# Patient Record
Sex: MALE | Race: WHITE
[De-identification: names, ages, dates, MRNs, and addresses within clinical notes are randomized per-mention and may not be internally consistent; named-entity substitution may affect disease eponyms.]

---

## 2018-12-12 ENCOUNTER — HOSPITAL ENCOUNTER (OUTPATIENT)
Dept: HOSPITAL 75 - CARD | Age: 58
End: 2018-12-12
Attending: NURSE PRACTITIONER
Payer: COMMERCIAL

## 2018-12-12 DIAGNOSIS — R42: ICD-10-CM

## 2018-12-12 DIAGNOSIS — I49.3: Primary | ICD-10-CM

## 2018-12-12 DIAGNOSIS — R51: ICD-10-CM

## 2018-12-12 PROCEDURE — 93226 XTRNL ECG REC<48 HR SCAN A/R: CPT

## 2018-12-12 PROCEDURE — 93225 XTRNL ECG REC<48 HRS REC: CPT

## 2020-12-14 ENCOUNTER — HOSPITAL ENCOUNTER (OUTPATIENT)
Dept: HOSPITAL 75 - PREOP | Age: 60
Discharge: HOME | End: 2020-12-14
Attending: SURGERY
Payer: COMMERCIAL

## 2020-12-14 VITALS — HEIGHT: 67.99 IN | WEIGHT: 242.95 LBS | BODY MASS INDEX: 36.82 KG/M2

## 2020-12-14 DIAGNOSIS — Z01.818: Primary | ICD-10-CM

## 2020-12-18 ENCOUNTER — HOSPITAL ENCOUNTER (OUTPATIENT)
Dept: HOSPITAL 75 - LAB FS | Age: 60
End: 2020-12-18
Attending: SURGERY
Payer: COMMERCIAL

## 2020-12-18 DIAGNOSIS — Z20.828: ICD-10-CM

## 2020-12-18 DIAGNOSIS — Z01.812: Primary | ICD-10-CM

## 2020-12-18 DIAGNOSIS — Z12.11: ICD-10-CM

## 2020-12-18 PROCEDURE — 87635 SARS-COV-2 COVID-19 AMP PRB: CPT

## 2020-12-21 ENCOUNTER — HOSPITAL ENCOUNTER (OUTPATIENT)
Dept: HOSPITAL 75 - ENDO | Age: 60
Discharge: HOME | End: 2020-12-21
Attending: SURGERY
Payer: COMMERCIAL

## 2020-12-21 VITALS — DIASTOLIC BLOOD PRESSURE: 58 MMHG | SYSTOLIC BLOOD PRESSURE: 104 MMHG

## 2020-12-21 VITALS — DIASTOLIC BLOOD PRESSURE: 60 MMHG | SYSTOLIC BLOOD PRESSURE: 104 MMHG

## 2020-12-21 VITALS — DIASTOLIC BLOOD PRESSURE: 60 MMHG | SYSTOLIC BLOOD PRESSURE: 103 MMHG

## 2020-12-21 VITALS — BODY MASS INDEX: 36.82 KG/M2 | WEIGHT: 242.95 LBS | HEIGHT: 67.99 IN

## 2020-12-21 VITALS — SYSTOLIC BLOOD PRESSURE: 132 MMHG | DIASTOLIC BLOOD PRESSURE: 83 MMHG

## 2020-12-21 VITALS — SYSTOLIC BLOOD PRESSURE: 139 MMHG | DIASTOLIC BLOOD PRESSURE: 88 MMHG

## 2020-12-21 DIAGNOSIS — K64.8: ICD-10-CM

## 2020-12-21 DIAGNOSIS — E66.9: ICD-10-CM

## 2020-12-21 DIAGNOSIS — Z12.11: Primary | ICD-10-CM

## 2020-12-21 DIAGNOSIS — Z80.9: ICD-10-CM

## 2020-12-21 NOTE — PROGRESS NOTE-PRE OPERATIVE
Pre-Operative Progress Note


H&P Reviewed


The H&P was reviewed, patient examined and no changes noted.


Time Seen by Provider:  08:14


Date H&P Reviewed:  Dec 21, 2020


Time H&P Reviewed:  08:13


Pre-Operative Diagnosis:  screening colonoscopy











JYOTI WATTERS DO               Dec 21, 2020 08:15

## 2020-12-21 NOTE — ENDOSCOPY DISCHARGE INSTRUCT
Endo Procedure/Findings


Findings


1.:  Internal Hemorrhoids





Discharge Instructions


-


Activity: You might feel a little sleepy until tomorrow.  This is due to the me

dicine you received to relax you.





Until tomorrow, you should:  


   NOT drive a car, operate machinery or power tools.


   NOT drink any alcoholic beverages.


   NOT make any important decisions or sign importortant papers.





Do not return to work until tomorrow, unless otherwise instructed. Resume 

previous activities tomorrow.





Diet: Start by taking liquids.  If you tolerate liquids, advance to solid food.


1.:  Colonscopy in 10 years





Notify Physician


-


If you experience excessive bleeding, unusual abdominal pain, fever, or chest 

pain, contact your doctor immediately.











JYOTI WATTERS DO               Dec 21, 2020 09:47

## 2020-12-21 NOTE — OPERATIVE REPORT
DATE OF SERVICE:  12/21/2020



PREOPERATIVE DIAGNOSIS:

Screening colonoscopy.



POSTOPERATIVE DIAGNOSIS:

Internal hemorrhoids.



PROCEDURE:

Colonoscopy.



SURGEON:

Victor M Deleon DO



FIRST ASSISTANT:

None.



ANESTHESIA:

IV sedation by the CRNA.



SPECIMENS:

None.



BLOOD LOSS:

None.



FLUIDS:

Per anesthesia.



POSTOPERATIVE CONDITION:

Stable.



INDICATION FOR PROCEDURE:

The patient is a 60-year-old male who has never had a colonoscopy, needs one for

screening.



FINDINGS:

The patient had some internal hemorrhoids, but no other obvious pathology.



PROCEDURE NOTE:

After informed consent was obtained, the patient was brought to the endoscopy

suite, placed in bed in left lateral decubitus position.  He was administered IV

sedation by the CRNA who then monitored his vitals the entire time, heart rate,

blood pressure and pulse ox and the scope was inserted, pushed all the way into

about 150 cm, able to get to the cecum, took a picture of appendiceal orifice,

noted the ileocecal valve and then slowly withdrew the scope insufflating to

look circumferentially at the walls looking the cecum, up the ascending colon to

the hepatic flexure, then down the transverse colon, splenic flexure, into the

descending colon and down in the sigmoid and finally into the rectum,

retroflexed in the rectal vault, saw some at least grade I, possibly grade II

internal hemorrhoids, took a picture of this and then removed the scope.  The

patient tolerated the procedure and he was recovered in endoscopy suite.





Job ID: [id number]

DocumentID: [id number]

Dictated Date:  12/21/2020 14:58:36

Transcription Date: 12/21/2020 20:08:10

Dictated By: VICTOR M DELEON DO

## 2020-12-21 NOTE — ANESTHESIA-GENERAL POST-OP
MAC


Patient Condition


Mental Status/LOC:  Same as Preop


Cardiovascular:  Satisfactory


Nausea/Vomiting:  Absent


Respiratory:  Satisfactory


Pain:  Controlled


Complications:  Absent





Post Op Complications


Complications


None





Follow Up Care/Instructions


Patient Instructions


None needed.





Anesthesiology Discharge Order


Discharge Order


Patient is doing well, no complaints, stable vital signs, no apparent adverse 

anesthesia problems.   


No complications reported per nursing.











MERT SU CRNA              Dec 21, 2020 12:21

## 2020-12-21 NOTE — PROGRESS NOTE-POST OPERATIVE
Post-Operative Progess Note


Surgeon (s)/Assistant (s)


Surgeon


JYOTI WATTERS DO


Assistant:  none





Pre-Operative Diagnosis


screening colonoscopy





Post-Operative Diagnosis





int hemorrhoids





Procedure & Operative Findings


Date of Procedure


12/21/20


Procedure Performed/Findings


colon


Anesthesia Type


IV sedation by CRNA





Estimated Blood Loss


Estimated blood loss (mL):  none





Specimens/Packing


Specimens Removed


none











JYOTI WATTERS DO               Dec 21, 2020 09:46